# Patient Record
Sex: MALE
[De-identification: names, ages, dates, MRNs, and addresses within clinical notes are randomized per-mention and may not be internally consistent; named-entity substitution may affect disease eponyms.]

---

## 2020-08-19 ENCOUNTER — HOSPITAL ENCOUNTER (EMERGENCY)
Dept: HOSPITAL 5 - ED | Age: 30
Discharge: HOME | End: 2020-08-19
Payer: SELF-PAY

## 2020-08-19 VITALS — DIASTOLIC BLOOD PRESSURE: 70 MMHG | SYSTOLIC BLOOD PRESSURE: 122 MMHG

## 2020-08-19 DIAGNOSIS — S51.812A: Primary | ICD-10-CM
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
## 2020-08-19 DIAGNOSIS — Z79.899: ICD-10-CM
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
## 2020-08-19 DIAGNOSIS — S51.802A: ICD-10-CM

## 2020-08-19 PROCEDURE — 0DQQXZZ REPAIR ANUS, EXTERNAL APPROACH: ICD-10-PCS

## 2020-08-19 PROCEDURE — 90715 TDAP VACCINE 7 YRS/> IM: CPT

## 2020-08-19 PROCEDURE — 99283 EMERGENCY DEPT VISIT LOW MDM: CPT
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
## 2021-02-11 ENCOUNTER — HOSPITAL ENCOUNTER (EMERGENCY)
Dept: HOSPITAL 5 - ED | Age: 31
Discharge: HOME | End: 2021-02-11
Payer: SELF-PAY

## 2021-02-11 VITALS — SYSTOLIC BLOOD PRESSURE: 115 MMHG | DIASTOLIC BLOOD PRESSURE: 64 MMHG

## 2021-02-11 DIAGNOSIS — Z98.890: ICD-10-CM

## 2021-02-11 DIAGNOSIS — Z79.899: ICD-10-CM

## 2021-02-11 DIAGNOSIS — L02.01: Primary | ICD-10-CM

## 2021-02-11 PROCEDURE — 99282 EMERGENCY DEPT VISIT SF MDM: CPT
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
## 2021-02-13 ENCOUNTER — HOSPITAL ENCOUNTER (EMERGENCY)
Dept: HOSPITAL 5 - ED | Age: 31
Discharge: HOME | End: 2021-02-13
Payer: SELF-PAY

## 2021-02-13 VITALS — SYSTOLIC BLOOD PRESSURE: 127 MMHG | DIASTOLIC BLOOD PRESSURE: 66 MMHG

## 2021-02-13 DIAGNOSIS — L02.01: Primary | ICD-10-CM

## 2021-02-13 DIAGNOSIS — Z98.890: ICD-10-CM

## 2021-02-13 DIAGNOSIS — Z79.899: ICD-10-CM

## 2021-02-13 PROCEDURE — 99282 EMERGENCY DEPT VISIT SF MDM: CPT
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
